# Patient Record
Sex: MALE | Race: WHITE | NOT HISPANIC OR LATINO
[De-identification: names, ages, dates, MRNs, and addresses within clinical notes are randomized per-mention and may not be internally consistent; named-entity substitution may affect disease eponyms.]

---

## 2018-05-13 ENCOUNTER — FORM ENCOUNTER (OUTPATIENT)
Age: 23
End: 2018-05-13

## 2018-05-14 ENCOUNTER — OUTPATIENT (OUTPATIENT)
Dept: OUTPATIENT SERVICES | Facility: HOSPITAL | Age: 23
LOS: 1 days | End: 2018-05-14
Payer: COMMERCIAL

## 2018-05-14 ENCOUNTER — APPOINTMENT (OUTPATIENT)
Dept: ORTHOPEDIC SURGERY | Facility: CLINIC | Age: 23
End: 2018-05-14
Payer: COMMERCIAL

## 2018-05-14 ENCOUNTER — APPOINTMENT (OUTPATIENT)
Dept: MRI IMAGING | Facility: CLINIC | Age: 23
End: 2018-05-14
Payer: COMMERCIAL

## 2018-05-14 VITALS
BODY MASS INDEX: 33.34 KG/M2 | WEIGHT: 220 LBS | SYSTOLIC BLOOD PRESSURE: 138 MMHG | DIASTOLIC BLOOD PRESSURE: 87 MMHG | HEIGHT: 68 IN | HEART RATE: 72 BPM

## 2018-05-14 DIAGNOSIS — Z78.9 OTHER SPECIFIED HEALTH STATUS: ICD-10-CM

## 2018-05-14 DIAGNOSIS — Z00.8 ENCOUNTER FOR OTHER GENERAL EXAMINATION: ICD-10-CM

## 2018-05-14 PROBLEM — Z00.00 ENCOUNTER FOR PREVENTIVE HEALTH EXAMINATION: Status: ACTIVE | Noted: 2018-05-14

## 2018-05-14 PROCEDURE — 73562 X-RAY EXAM OF KNEE 3: CPT | Mod: RT

## 2018-05-14 PROCEDURE — 73721 MRI JNT OF LWR EXTRE W/O DYE: CPT

## 2018-05-14 PROCEDURE — 73721 MRI JNT OF LWR EXTRE W/O DYE: CPT | Mod: 26,RT

## 2018-05-14 PROCEDURE — 99204 OFFICE O/P NEW MOD 45 MIN: CPT

## 2018-06-12 ENCOUNTER — APPOINTMENT (OUTPATIENT)
Dept: ORTHOPEDIC SURGERY | Facility: CLINIC | Age: 23
End: 2018-06-12
Payer: COMMERCIAL

## 2018-06-12 VITALS
HEART RATE: 67 BPM | BODY MASS INDEX: 33.34 KG/M2 | DIASTOLIC BLOOD PRESSURE: 85 MMHG | SYSTOLIC BLOOD PRESSURE: 135 MMHG | HEIGHT: 68 IN | WEIGHT: 220 LBS

## 2018-06-12 PROCEDURE — 99215 OFFICE O/P EST HI 40 MIN: CPT

## 2018-10-15 ENCOUNTER — APPOINTMENT (OUTPATIENT)
Dept: ORTHOPEDIC SURGERY | Facility: CLINIC | Age: 23
End: 2018-10-15
Payer: COMMERCIAL

## 2018-10-15 VITALS
HEIGHT: 68 IN | HEART RATE: 69 BPM | BODY MASS INDEX: 32.58 KG/M2 | DIASTOLIC BLOOD PRESSURE: 90 MMHG | SYSTOLIC BLOOD PRESSURE: 139 MMHG | WEIGHT: 215 LBS

## 2018-10-15 DIAGNOSIS — M23.91 UNSPECIFIED INTERNAL DERANGEMENT OF RIGHT KNEE: ICD-10-CM

## 2018-10-15 DIAGNOSIS — S89.91XA UNSPECIFIED INJURY OF RIGHT LOWER LEG, INITIAL ENCOUNTER: ICD-10-CM

## 2018-10-15 PROCEDURE — 99213 OFFICE O/P EST LOW 20 MIN: CPT

## 2018-12-12 RX ORDER — ASPIRIN 325 MG/1
325 TABLET, FILM COATED ORAL DAILY
Qty: 14 | Refills: 0 | Status: ACTIVE | COMMUNITY
Start: 2018-12-12 | End: 1900-01-01

## 2018-12-12 RX ORDER — OXYCODONE 5 MG/1
5 TABLET ORAL
Qty: 56 | Refills: 0 | Status: ACTIVE | COMMUNITY
Start: 2018-12-12 | End: 1900-01-01

## 2018-12-12 RX ORDER — ONDANSETRON 4 MG/1
4 TABLET ORAL
Qty: 56 | Refills: 0 | Status: ACTIVE | COMMUNITY
Start: 2018-12-12 | End: 1900-01-01

## 2018-12-12 NOTE — H&P ADULT - NSHPPHYSICALEXAM_GEN_ALL_CORE
Physical Exam:  General: Well appearing, no acute distress, A&O  Neurologic: A&Ox3, No focal deficits  Head: NCAT without abrasions, lacerations, or ecchymosis to head, face, or scalp  Eyes: No scleral icterus, no gross abnormalities  Respiratory: Equal chest wall expansion bilaterally, no accessory muscle use  Lymphatic: No lymphadenopathy palpated  Skin: Warm and dry  Psychiatric: Normal mood and affect  Right knee    Inspection/Palpation: Gait evaluation does reveal a limp. There is no inguinal adenopathy. Limb is well-perfused, without skin lesions, shows a grossly normal motor and sensory examination.   ROM: The Right knee motion is significantly reduced and does cause significant pain. The knee exhibits a moderate effusion.   Muscle/Nerves: Quad strength decreased secondary to injury. Motor exam 5/ distally, EHL/FHL/GSC/TA  SILT L4-S1  Special Tests:   Joint line tenderness noted medial joint line at 0 and 90 degrees.   Positive Tiffany test. Positive Appley grind test  Stable in varus and valgus stress at 0 and 30 degrees  Negative posterior drawer.   The knee has a negative Lachman and negative anterior drawer.  Normal hip and ankle exam.     Left Knee    Inspection/Palpation: There is no inguinal adenopathy. Well perfused, without skin lesions, shows a grossly normal motor and sensory examination.   ROM: Normal  Muscle/Nerves: Quad strength decreased secondary to injury. Motor exam 5/ distally, EHL/FHL/GSC/TA  SILT L4-S1  Special Tests:   No Joint line tenderness noted at medial and lateral joint line at 0 and 90 degrees.   Stable in varus and valgus stress at 0 and 30 degrees  Negative posterior drawer.   Negative anterior drawer and stable Lachman   Normal hip and ankle exam.

## 2018-12-12 NOTE — H&P ADULT - NSHPLABSRESULTS_GEN_ALL_CORE
MRI was performed in May 14, 2018 at the [facility] in [place]. The MRI report and it is rubella free to review. He has a vertical tear involving the body and posterior horn of the medial meniscus with a small flap meniscal tissues and into the inferomedial gutter.

## 2018-12-12 NOTE — H&P ADULT - HISTORY OF PRESENT ILLNESS
Felice is a 23 mL comes in complaining of right knee pain x10 months. He sustained a twisting injury to his knee while snowboarding last January. He was seat with her surgeons in [place] an MRI was completed in May of 23. He has a complex meniscus tear the medial side with possible radial components and horizontal components to it. He states he has swelling in his knee at all times and gives him pain at all times. He says the knee is stiff and ice has not helped it. He does have a brace which gives her some improvement in symptoms. He does get out and multiple times throughout the day and also has locked on him on a few occasions. He has tried oral inflammatories and has failed.

## 2018-12-13 ENCOUNTER — APPOINTMENT (OUTPATIENT)
Dept: ORTHOPEDIC SURGERY | Facility: HOSPITAL | Age: 23
End: 2018-12-13

## 2018-12-13 ENCOUNTER — OUTPATIENT (OUTPATIENT)
Dept: OUTPATIENT SERVICES | Facility: HOSPITAL | Age: 23
LOS: 1 days | Discharge: HOME | End: 2018-12-13
Payer: COMMERCIAL

## 2018-12-13 ENCOUNTER — RESULT REVIEW (OUTPATIENT)
Age: 23
End: 2018-12-13

## 2018-12-13 VITALS
TEMPERATURE: 97 F | WEIGHT: 220.02 LBS | OXYGEN SATURATION: 99 % | HEART RATE: 67 BPM | SYSTOLIC BLOOD PRESSURE: 131 MMHG | HEIGHT: 68 IN | DIASTOLIC BLOOD PRESSURE: 86 MMHG | RESPIRATION RATE: 16 BRPM

## 2018-12-13 VITALS
HEART RATE: 66 BPM | DIASTOLIC BLOOD PRESSURE: 69 MMHG | OXYGEN SATURATION: 100 % | RESPIRATION RATE: 16 BRPM | SYSTOLIC BLOOD PRESSURE: 133 MMHG

## 2018-12-13 DIAGNOSIS — M23.351 OTHER MENISCUS DERANGEMENTS, POSTERIOR HORN OF LATERAL MENISCUS, RIGHT KNEE: ICD-10-CM

## 2018-12-13 PROCEDURE — 29879 ARTHRS KNE SRG ABRASJ ARTHRP: CPT | Mod: RT

## 2018-12-13 PROCEDURE — 88304 TISSUE EXAM BY PATHOLOGIST: CPT | Mod: 26

## 2018-12-13 PROCEDURE — 29882 ARTHRS KNE SRG MNISC RPR M/L: CPT | Mod: RT

## 2018-12-13 RX ORDER — ONDANSETRON 8 MG/1
4 TABLET, FILM COATED ORAL ONCE
Qty: 0 | Refills: 0 | Status: DISCONTINUED | OUTPATIENT
Start: 2018-12-13 | End: 2018-12-13

## 2018-12-13 RX ORDER — ONDANSETRON 8 MG/1
4 TABLET, FILM COATED ORAL
Qty: 0 | Refills: 0 | COMMUNITY

## 2018-12-13 RX ORDER — ASPIRIN/CALCIUM CARB/MAGNESIUM 324 MG
1 TABLET ORAL
Qty: 0 | Refills: 0 | COMMUNITY

## 2018-12-13 RX ORDER — OXYCODONE HYDROCHLORIDE 5 MG/1
1 TABLET ORAL
Qty: 0 | Refills: 0 | COMMUNITY

## 2018-12-13 RX ORDER — SODIUM CHLORIDE 9 MG/ML
1000 INJECTION, SOLUTION INTRAVENOUS
Qty: 0 | Refills: 0 | Status: DISCONTINUED | OUTPATIENT
Start: 2018-12-13 | End: 2018-12-13

## 2018-12-13 RX ORDER — ACETAMINOPHEN 500 MG
1000 TABLET ORAL ONCE
Qty: 0 | Refills: 0 | Status: DISCONTINUED | OUTPATIENT
Start: 2018-12-13 | End: 2018-12-13

## 2018-12-13 RX ORDER — HYDROMORPHONE HYDROCHLORIDE 2 MG/ML
0.5 INJECTION INTRAMUSCULAR; INTRAVENOUS; SUBCUTANEOUS
Qty: 0 | Refills: 0 | Status: DISCONTINUED | OUTPATIENT
Start: 2018-12-13 | End: 2018-12-13

## 2018-12-13 RX ORDER — OXYCODONE HYDROCHLORIDE 5 MG/1
5 TABLET ORAL ONCE
Qty: 0 | Refills: 0 | Status: DISCONTINUED | OUTPATIENT
Start: 2018-12-13 | End: 2018-12-13

## 2018-12-13 RX ADMIN — OXYCODONE HYDROCHLORIDE 5 MILLIGRAM(S): 5 TABLET ORAL at 10:33

## 2018-12-13 NOTE — ASU DISCHARGE PLAN (ADULT/PEDIATRIC). - NOTIFY
Numbness, tingling/Fever greater than 101/Numbness, color, or temperature change to extremity/Swelling that continues

## 2018-12-13 NOTE — ASU DISCHARGE PLAN (ADULT/PEDIATRIC). - MEDICATION SUMMARY - MEDICATIONS TO TAKE
I will START or STAY ON the medications listed below when I get home from the hospital:    oxyCODONE 5 mg oral tablet  -- 1 tab(s) by mouth every 6 hours, As Needed  -- Indication: For TORN MENISCUS RIGHT KNEE    Aspirin Enteric Coated 325 mg oral delayed release tablet  -- 1 tab(s) by mouth once a day  -- Indication: For TORN MENISCUS RIGHT KNEE    Zofran  -- 4 milligram(s) by mouth 4 times a day, As Needed  -- Indication: For TORN MENISCUS RIGHT KNEE

## 2018-12-13 NOTE — BRIEF OPERATIVE NOTE - PROCEDURE
<<-----Click on this checkbox to enter Procedure Meniscus repair of knee  12/13/2018    Active  DAVE

## 2018-12-18 DIAGNOSIS — M65.861 OTHER SYNOVITIS AND TENOSYNOVITIS, RIGHT LOWER LEG: ICD-10-CM

## 2018-12-18 DIAGNOSIS — M23.203 DERANGEMENT OF UNSPECIFIED MEDIAL MENISCUS DUE TO OLD TEAR OR INJURY, RIGHT KNEE: ICD-10-CM

## 2018-12-19 LAB — SURGICAL PATHOLOGY FINAL REPORT - CH: SIGNIFICANT CHANGE UP

## 2018-12-26 ENCOUNTER — APPOINTMENT (OUTPATIENT)
Dept: ORTHOPEDIC SURGERY | Facility: CLINIC | Age: 23
End: 2018-12-26
Payer: COMMERCIAL

## 2018-12-26 VITALS
TEMPERATURE: 67 F | SYSTOLIC BLOOD PRESSURE: 120 MMHG | WEIGHT: 215 LBS | HEART RATE: 67 BPM | BODY MASS INDEX: 32.58 KG/M2 | DIASTOLIC BLOOD PRESSURE: 77 MMHG | HEIGHT: 68 IN

## 2018-12-26 PROCEDURE — 99024 POSTOP FOLLOW-UP VISIT: CPT

## 2019-01-22 ENCOUNTER — APPOINTMENT (OUTPATIENT)
Dept: ORTHOPEDIC SURGERY | Facility: CLINIC | Age: 24
End: 2019-01-22
Payer: COMMERCIAL

## 2019-01-22 VITALS
BODY MASS INDEX: 32.58 KG/M2 | HEIGHT: 68 IN | DIASTOLIC BLOOD PRESSURE: 85 MMHG | SYSTOLIC BLOOD PRESSURE: 141 MMHG | WEIGHT: 215 LBS | HEART RATE: 66 BPM | TEMPERATURE: 97.8 F

## 2019-01-22 PROCEDURE — 99024 POSTOP FOLLOW-UP VISIT: CPT

## 2019-01-22 NOTE — HISTORY OF PRESENT ILLNESS
[___ Weeks Post Op] : [unfilled] weeks post op [de-identified] : S/P R knee arthroscopy.  DOS: 12/13/2018. [de-identified] : Pt. presents in office PWB on crutches without brace.  He is going back up to college at [employer] and will need to find a physical therapist there.   [de-identified] : Incision sites are healed well with no sign of erythema or drainage. He has a mild to moderate effusion noted although does not change his range of motion. He has no tenderness over the medial or lateral joint lines both at zero and 90°. His knee is stable to a Lachman test, negative anterior drawer, negative posterior drawer. No varus or valgus laxity on stressing. [de-identified] : Felice is to resume well status post medial meniscus repair. He is currently doing very well. He is now weightbearing as tolerated but may use crutches as needed. He is going back to school today I will see me back in March for followup. He has a physical therapy office set up at school and will work with the athletic trainers there as well. All questions are answered and he agrees with the above plan.

## 2019-02-25 ENCOUNTER — APPOINTMENT (OUTPATIENT)
Dept: ORTHOPEDIC SURGERY | Facility: CLINIC | Age: 24
End: 2019-02-25
Payer: COMMERCIAL

## 2019-02-25 DIAGNOSIS — S83.249A OTHER TEAR OF MEDIAL MENISCUS, CURRENT INJURY, UNSPECIFIED KNEE, INITIAL ENCOUNTER: ICD-10-CM

## 2019-02-25 PROCEDURE — 99024 POSTOP FOLLOW-UP VISIT: CPT

## 2019-02-25 NOTE — HISTORY OF PRESENT ILLNESS
[___ Weeks Post Op] : [unfilled] weeks post op [de-identified] : S/P R knee arthroscopy.  DOS: 12/13/2018. [de-identified] : Patient presents for f/u of his R knee.  He has occasional posterior knee pain when he stands for long periods of time.  He has tenderness to medial hamstrings.  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   [de-identified] : Incision sites are healed well with no sign of erythema or drainage. He has a mild  effusion noted ROM 0-130. He has no tenderness over the medial or lateral joint lines both at zero and 90°. His knee is stable to a Lachman test, negative anterior drawer, negative posterior drawer. No varus or valgus laxity on stressing. [de-identified] : Felice is to resume well status post medial meniscus repair. He is currently doing very well. He will continue PT and may start increased activities. I will see him back in 6-8 weeks. He agrees with the above plan.

## 2021-01-05 ENCOUNTER — APPOINTMENT (OUTPATIENT)
Dept: ORTHOPEDIC SURGERY | Facility: CLINIC | Age: 26
End: 2021-01-05
Payer: COMMERCIAL

## 2021-01-05 DIAGNOSIS — M70.51 OTHER BURSITIS OF KNEE, RIGHT KNEE: ICD-10-CM

## 2021-01-05 DIAGNOSIS — Z98.890 OTHER SPECIFIED POSTPROCEDURAL STATES: ICD-10-CM

## 2021-01-05 PROCEDURE — 20610 DRAIN/INJ JOINT/BURSA W/O US: CPT | Mod: RT

## 2021-01-05 PROCEDURE — 99072 ADDL SUPL MATRL&STAF TM PHE: CPT

## 2021-01-05 PROCEDURE — 99214 OFFICE O/P EST MOD 30 MIN: CPT | Mod: 25

## 2021-01-05 NOTE — DISCUSSION/SUMMARY
[de-identified] : Patient presents for f/u of his R knee.  He presents with right knee suprapatellar swelling.  He denies joint line tenderness, however he admits to occasional knee pain that radiates to his quadriceps and down into his ankle.  He denies treatment for this issue recently.  The patient denies numbness/tingling to the extremity.  Patient works long hours on his feet which makes his swelling and pain worse. He denies locking, however he admits to buckling and clicking.\par \par Due to his mechanical symptoms, recurrent effusions, and based on his clinical exam today, an MRI is indicated to evaluate for re-tear of his meniscus. 10 cc serous fluid was aspirated from his knee today and the patient tolerated this well. He will use compression and ice the joint over this next week. He will get the MRI performed and we will discuss the results with him over the phone once obtained. He agrees with the above plan and all questions were answered.\par

## 2021-01-05 NOTE — HISTORY OF PRESENT ILLNESS
[Worsening] : worsening [___ mths] : [unfilled] month(s) ago [2] : an average pain level of 2/10 [0] : a minimum pain level of 0/10 [4] : a maximum pain level of 4/10 [Walking] : walking [Standing] : standing [Intermit.] : ~He/She~ states the symptoms seem to be intermittent [Bending] : worsened by bending [Sitting] : worsened by sitting [Running] : worsened by running [None] : No relieving factors are noted [de-identified] : Patient presents for f/u of his R knee.  He presents with right knee suprapatellar swelling.  He denies joint line tenderness, however he admits to occasional knee pain that radiates to his quadriceps and down into his ankle.  He denies treatment for this issue recently.  The patient denies numbness/tingling to the extremity.  Patient works long hours on his feet which makes his swelling and pain worse. He denies locking, however he admits to buckling and clicking.

## 2021-01-05 NOTE — PROCEDURE
[de-identified] : Aspiration: Right knee joint.\par Indication: Effusion.\par \par A discussion was had with the patient regarding this procedure and all questions were answered. All risks, benefits and alternatives were discussed. These included but were not limited to bleeding, infection, and reaccumulation of fluid. Alcohol was used to clean the skin, and betadine was used to sterilize and prep the area in the suprapatella bursa of the right knee. Ethyl chloride spray was then used as a topical anesthetic. An 18-gauge needle was used to aspirate the knee joint and approximately 10 cc of serous fluid was aspirated from the knee without complication. A sterile bandage was then applied. The patient tolerated the procedure well.

## 2021-01-05 NOTE — PHYSICAL EXAM
[de-identified] : Physical Exam:\par General: Well appearing, no acute distress\par Neurologic: A&Ox3, No focal deficits\par Head: NCAT without abrasions, lacerations, or ecchymosis to head, face, or scalp\par Eyes: No scleral icterus, no gross abnormalities\par Respiratory: Equal chest wall expansion bilaterally, no accessory muscle use\par Lymphatic: No lymphadenopathy palpated\par Skin: Warm and dry\par Psychiatric: Normal mood and affect\par \par Right knee\par \par Inspection/Palpation: Gait evaluation does reveal a limp. There is no inguinal adenopathy. Limb is well-perfused, without skin lesions, shows a grossly normal motor and sensory examination. \par ROM: The Right knee motion is significantly reduced and does cause significant pain. The knee exhibits a moderate effusion. \par Muscle/Nerves: Quad strength decreased secondary to injury. Motor exam 5/ distally, EHL/FHL/GSC/TA\par SILT L4-S1\par Special Tests: \par Joint line tenderness noted medial joint line at 0 and 90 degrees. \par Positive Thessaly. Positive Tiffany test. Positive Appley grind test\par Stable in varus and valgus stress at 0 and 30 degrees\par Negative posterior drawer. \par The knee has a negative Lachman and negative anterior drawer.\par Normal hip and ankle exam. \par \par Left Knee\par \par Inspection/Palpation: There is no inguinal adenopathy. Well perfused, without skin lesions, shows a grossly normal motor and sensory examination. \par ROM: Normal\par Muscle/Nerves: Quad strength decreased secondary to injury. Motor exam 5/ distally, EHL/FHL/GSC/TA\par SILT L4-S1\par Special Tests: \par No Joint line tenderness noted  at medial and lateral joint line at 0 and 90 degrees. \par Stable in varus and valgus stress at 0 and 30 degrees\par Negative posterior drawer. \par Negative anterior drawer and stable Lachman \par Normal hip and ankle exam.

## 2021-01-05 NOTE — REASON FOR VISIT
[Follow-Up Visit] : a follow-up visit for [FreeTextEntry2] :  S/P R knee arthroscopy. DOS: 12/13/2018.

## 2021-01-06 ENCOUNTER — APPOINTMENT (OUTPATIENT)
Dept: MRI IMAGING | Facility: CLINIC | Age: 26
End: 2021-01-06